# Patient Record
Sex: MALE | Race: ASIAN | NOT HISPANIC OR LATINO
[De-identification: names, ages, dates, MRNs, and addresses within clinical notes are randomized per-mention and may not be internally consistent; named-entity substitution may affect disease eponyms.]

---

## 2017-10-08 ENCOUNTER — CHARTING TRANS (OUTPATIENT)
Dept: OTHER | Age: 42
End: 2017-10-08

## 2024-12-27 ENCOUNTER — HOSPITAL ENCOUNTER (OUTPATIENT)
Dept: CT IMAGING | Age: 49
Discharge: HOME | End: 2024-12-27
Attending: FAMILY MEDICINE

## 2024-12-27 DIAGNOSIS — Z13.6 SCREENING FOR HEART DISEASE: ICD-10-CM

## 2024-12-27 NOTE — PROGRESS NOTES
Date of Service 12/27/2024    FELICIA SEWELL  Date of Birth 6/29/1975    Patient Age: 49 year old    PCP: Keisha Dockery MD  [id number] [facility] [id number]    Heart Scan Consult  Preliminary Heart Scan Score: 45.5    Previous Screening  Heart Scan Completed Previously: No        Peripheral Vascular Scan Completed Previously: No          Risk Factors  Personal Risk Factors  Non-alterable Risk Factors: Personal History;Age;Gender;Family History  Alterable Risk Factors: Abnormal Cholesterol;Lack of exercise;Obesity;Unhealthy eating          Blood Pressure  There were no vitals taken for this visit.  (Normal =< 120/80,  Elevated = 120-129/ >80,  High Stage1 130-139/80-89 , Stage2 >140/>90)    Lipid Profile  No Lipid results on file in last 5 years .    Cholesterol Goals  Value   Total  =< 200   HDL  = > 45 Men = > 55 Women   LDL   =< 100   Triglycerides  =< 150       Glucose and Hemoglobin A1C  No results found for: \"PGLU\", \"GLU\", \"A1C\"  (Normal Fasting Glucose < 100mg/dl )    Nurse Review  Risk factor information and results reviewed with Nurse: Yes    Recommended Follow Up:  Consult your physician regarding::   Final Heart Scan Report;  Discuss potential for Incidental Finding;  Discuss Potential for Score Variance      Recommendations for Change:  Nutrition Changes: Low Saturated Fat;Low Fat Dairy;Increase Fiber    Cholesterol Modification (goal of therapy depends upon your risk):   Increase HDL (Healthy/Good) Normal >45 Men >55 Women;  Decrease LDL (Lousy/Bad) Ideal <100     (Today's FASTING Cholestech Values:  Total Cholesterol-238, HDL-31, LDL-183, Triglycerides-119, Glucose-96)    Exercise: Enhance Current Program         Weight Management: Decrease Current Weight         Repeat Heart Scan:   3 Years if Calcium Score is > 0.0;  5 years if Calcium Score is 0.0;  Discuss with your Physician              [place] Recommended Resources:  Recommended Resources: Upcoming Classes, Medical  Services and Health Library [email or web address];    PV Screening  Recommended PV Screening: Carotids;Abdomen;Ankle-Brachial Index (WILLARD)      Other Resources:: Educational handouts provided.      Umberto LOBO RN        Please Contact the Nurse Heart Line with any Questions or Concerns [phone].